# Patient Record
Sex: MALE | Race: WHITE | NOT HISPANIC OR LATINO | Employment: FULL TIME | URBAN - METROPOLITAN AREA
[De-identification: names, ages, dates, MRNs, and addresses within clinical notes are randomized per-mention and may not be internally consistent; named-entity substitution may affect disease eponyms.]

---

## 2024-02-05 ENCOUNTER — OFFICE VISIT (OUTPATIENT)
Dept: UROLOGY | Facility: CLINIC | Age: 34
End: 2024-02-05
Payer: COMMERCIAL

## 2024-02-05 ENCOUNTER — LAB VISIT (OUTPATIENT)
Dept: LAB | Facility: HOSPITAL | Age: 34
End: 2024-02-05
Attending: UROLOGY
Payer: COMMERCIAL

## 2024-02-05 VITALS
DIASTOLIC BLOOD PRESSURE: 79 MMHG | SYSTOLIC BLOOD PRESSURE: 122 MMHG | WEIGHT: 260 LBS | HEIGHT: 72 IN | HEART RATE: 69 BPM | BODY MASS INDEX: 35.21 KG/M2

## 2024-02-05 DIAGNOSIS — E29.1 TESTICULAR FAILURE: ICD-10-CM

## 2024-02-05 DIAGNOSIS — E29.1 TESTICULAR FAILURE: Primary | ICD-10-CM

## 2024-02-05 LAB
ESTRADIOL SERPL-MCNC: 22 PG/ML (ref 11–44)
FSH SERPL-ACNC: 7.58 MIU/ML (ref 0.95–11.95)
LH SERPL-ACNC: 4.6 MIU/ML (ref 0.6–12.1)
PROLACTIN SERPL IA-MCNC: 9.9 NG/ML (ref 3.5–19.4)
TESTOST SERPL-MCNC: 575 NG/DL (ref 304–1227)

## 2024-02-05 PROCEDURE — 1160F RVW MEDS BY RX/DR IN RCRD: CPT | Mod: CPTII,S$GLB,, | Performed by: UROLOGY

## 2024-02-05 PROCEDURE — 1159F MED LIST DOCD IN RCRD: CPT | Mod: CPTII,S$GLB,, | Performed by: UROLOGY

## 2024-02-05 PROCEDURE — 83002 ASSAY OF GONADOTROPIN (LH): CPT | Performed by: UROLOGY

## 2024-02-05 PROCEDURE — 84403 ASSAY OF TOTAL TESTOSTERONE: CPT | Performed by: UROLOGY

## 2024-02-05 PROCEDURE — 84146 ASSAY OF PROLACTIN: CPT | Performed by: UROLOGY

## 2024-02-05 PROCEDURE — 3074F SYST BP LT 130 MM HG: CPT | Mod: CPTII,S$GLB,, | Performed by: UROLOGY

## 2024-02-05 PROCEDURE — 88230 TISSUE CULTURE LYMPHOCYTE: CPT | Performed by: UROLOGY

## 2024-02-05 PROCEDURE — 99999 PR PBB SHADOW E&M-NEW PATIENT-LVL III: CPT | Mod: PBBFAC,,, | Performed by: UROLOGY

## 2024-02-05 PROCEDURE — 82670 ASSAY OF TOTAL ESTRADIOL: CPT | Performed by: UROLOGY

## 2024-02-05 PROCEDURE — 81403 MOPATH PROCEDURE LEVEL 4: CPT | Performed by: UROLOGY

## 2024-02-05 PROCEDURE — 88262 CHROMOSOME ANALYSIS 15-20: CPT | Performed by: UROLOGY

## 2024-02-05 PROCEDURE — 3008F BODY MASS INDEX DOCD: CPT | Mod: CPTII,S$GLB,, | Performed by: UROLOGY

## 2024-02-05 PROCEDURE — 3078F DIAST BP <80 MM HG: CPT | Mod: CPTII,S$GLB,, | Performed by: UROLOGY

## 2024-02-05 PROCEDURE — 99204 OFFICE O/P NEW MOD 45 MIN: CPT | Mod: S$GLB,,, | Performed by: UROLOGY

## 2024-02-05 PROCEDURE — 83001 ASSAY OF GONADOTROPIN (FSH): CPT | Performed by: UROLOGY

## 2024-02-05 RX ORDER — TADALAFIL 20 MG/1
20 TABLET ORAL DAILY
COMMUNITY
Start: 2024-01-11

## 2024-02-05 NOTE — PROGRESS NOTES
"Chief Complaint:  Infertility    HPI:    Mr. Jarrett is a 33 y.o.  male who has been  to his wife for the past 2 years. They have been trying to achieve a pregnancy for the past 6 months but without success. 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 He denies a history of erectile dysfunction and ejaculatory problems.    He has achieved 0 pregnancies in the past.    SA 23 (PRADEEP)--1.7 cc/0.005 million per cc/51%/0%  SA 24 (PRADEEP)-2.1 cc/0.02 million per cc/54%/0%    Geetha Jarrett is 33 years old. ( [birth year]) Her menses are regular. She has not undergone prior hysterosalpingogram. She has achieved 0 prior pregnancies.  She sees Dr. Rosario.    The couple has not undergone prior intrauterine insemination procedures.    The couple has not undergone prior in-vitro fertilization procedures.    Olu Jarrett denies a history of exposure to harmful chemicals, toxins, and radiation.    No history of recent fevers greater than 101.5 degrees Farenheit.    No history of recent exposure to "wet heat."    No history of urological trauma or testicular torsion.    No history of prostatitis, epididymitis, and orchitis.    No history of post-pubertal mumps.    There is no known family history of fertility problems.    REVIEW OF SYSTEMS:     He denies headache, blurred vision, fever, nausea, vomiting, chills, abdominal pain, chest pain, sore throat, bleeding per rectum, cough, SOB, recent loss of consciousness, recent mental status changes, seizures, dizziness, or upper or lower extremity weakness.    PHYSICAL EXAM:     The patient generally appears in good health, is appropriately interactive, and is in no apparent distress.     Eyes: anicteric sclerae, moist conjunctivae; no lid-lag; PERRLA     HENT: Atraumatic; oropharynx clear with moist mucous membranes and no mucosal ulcerations;normal hard and soft palate.  No evidence of lymphadenopathy.    Neck: Trachea midline.  No " "thyromegaly.    Skin: No lesions.    Mental: Cooperative with normal affect.  Is oriented to time, place, and person.    Neuro: Grossly intact.    Chest: Normal inspiratory effort.   No accessory muscles.  No audible wheezes.  Respirations symmetric on inspiration and expiration.    Heart: Regular rhythm.      Abdomen:  Soft, non-tender. No masses or organomegaly. Bladder is not palpable. No evidence of flank discomfort. No evidence of inguinal hernia.    Genitourinary: Penis is normal with no evidence of plaques or induration. Urethral meatus is normal. Scrotum is normal. Testes are descended bilaterally with no evidence of abnormal masses or tenderness. Epididymis, vas deferens, and cord structures are normal bilaterally.  Testicular volume is approximately 18 cc bilaterally.    Extremities: No cyanosis, clubbing, or edema.    IMPRESSION & PLAN:    Mr. Jarrett is a 33 y.o.  male who has been  to his wife for the past 2 years. They have been trying to achieve a pregnancy for the past 6 months but without success. 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 He denies a history of erectile dysfunction and ejaculatory problems.    He has achieved 0 pregnancies in the past.    SA 12/18/23 (PRADEEP)--1.7 cc/0.005 million per cc/51%/0%  SA 1/14/24 (PRADEEP)-2.1 cc/0.02 million per cc/54%/0%    1.  FSH, LH, testosterone, prolactin, and estradiol serum levels today. Will also draw karyotype and Y chromosome microdeletion.  2.  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  Recommend sperm cryopreservation.  3.  Return to the clinic in 3 weeks to discuss test results and treatment plan.  4.  Recommend avoiding "wet heat."  5.  Recommend taking a multivitamin and 500 mg of vitamin c daily in addition to the multivitamin.  6.  Please send a copy of the note to Dr. Rosario.  Thank you for the consultation.    CC: Abraham      "

## 2024-02-05 NOTE — LETTER
February 5, 2024        Britt Rosario MD  [address] [place] [id number]             Syed Perez - Urology [facility] 4th Fl  [address]  [facility] [address]  Phone: [phone]   Patient: Olu Jarrett   MR Number: [id number]   YOB: 1990   Date of Visit: 2/5/2024       Dear Dr. Rosario:    Thank you for referring Olu Jarrett to me for evaluation. Attached you will find relevant portions of my assessment and plan of care.    If you have questions, please do not hesitate to call me. I look forward to following Olu Jarrett along with you.    Sincerely,      Sagar Abreu MD            CC  No Recipients    Enclosure

## 2024-02-08 LAB
GENETICIST REVIEW: NORMAL
SPECIMEN SOURCE: NORMAL
Y CHROM DEL BLD/T: NORMAL
Y MICRODELETION RELEASED BY: NORMAL
Y MICRODELETION RESULT SUMMARY: POSITIVE
Y MICRODELETION SPECIMEN: NORMAL

## 2024-02-13 LAB
CHROM BANDING METHOD: NORMAL
CHROMOSOME ANALYSIS CONGENITAL ADDITIONAL INFORMATION: NORMAL
CHROMOSOME ANALYSIS CONGENITAL RELEASED BY: NORMAL
CHROMOSOME ANALYSIS CONGENITAL RESULT SUMMARY: NORMAL
CLINICAL CYTOGENETICIST REVIEW: NORMAL
KARYOTYP SPEC: NORMAL
REASON FOR REFERRAL, CHROMOSOME ANALYSIS CONGENITAL: NORMAL
REF LAB TEST METHOD: NORMAL
SPECIMEN SOURCE: NORMAL
SPECIMEN, CHROMOSOME ANALYSIS CONGENITAL: NORMAL

## 2024-02-22 ENCOUNTER — PATIENT MESSAGE (OUTPATIENT)
Dept: UROLOGY | Facility: CLINIC | Age: 34
End: 2024-02-22
Payer: COMMERCIAL

## 2024-02-26 ENCOUNTER — OFFICE VISIT (OUTPATIENT)
Dept: UROLOGY | Facility: CLINIC | Age: 34
End: 2024-02-26
Payer: COMMERCIAL

## 2024-02-26 VITALS
WEIGHT: 268.94 LBS | HEIGHT: 72 IN | DIASTOLIC BLOOD PRESSURE: 78 MMHG | BODY MASS INDEX: 36.43 KG/M2 | SYSTOLIC BLOOD PRESSURE: 128 MMHG | HEART RATE: 69 BPM

## 2024-02-26 DIAGNOSIS — E29.1 TESTICULAR FAILURE: Primary | ICD-10-CM

## 2024-02-26 PROCEDURE — 3008F BODY MASS INDEX DOCD: CPT | Mod: CPTII,S$GLB,, | Performed by: UROLOGY

## 2024-02-26 PROCEDURE — 99214 OFFICE O/P EST MOD 30 MIN: CPT | Mod: S$GLB,,, | Performed by: UROLOGY

## 2024-02-26 PROCEDURE — 1160F RVW MEDS BY RX/DR IN RCRD: CPT | Mod: CPTII,S$GLB,, | Performed by: UROLOGY

## 2024-02-26 PROCEDURE — 99999 PR PBB SHADOW E&M-EST. PATIENT-LVL III: CPT | Mod: PBBFAC,,, | Performed by: UROLOGY

## 2024-02-26 PROCEDURE — 3074F SYST BP LT 130 MM HG: CPT | Mod: CPTII,S$GLB,, | Performed by: UROLOGY

## 2024-02-26 PROCEDURE — 1159F MED LIST DOCD IN RCRD: CPT | Mod: CPTII,S$GLB,, | Performed by: UROLOGY

## 2024-02-26 PROCEDURE — 3078F DIAST BP <80 MM HG: CPT | Mod: CPTII,S$GLB,, | Performed by: UROLOGY

## 2024-02-26 NOTE — PROGRESS NOTES
"Chief Complaint:  Infertility    HPI:    Mr. Jarrett is a 33 y.o.  male who has been  to his wife for the past 2 years. They have been trying to achieve a pregnancy for the past 6 months but without success. lOu Jarrett has undergone a semen analysis x 3 showing severe oligoteratosopermia. He denies a history of erectile dysfunction and ejaculatory problems.    He was supposed to cryo his last SA, but he didn't do it.    Karyotype is normal.  He has an AZFc deletion.  Lab Results   Component Value Date    TOTALTESTOST 575 2024    LABLH 4.6 2024    FSH 7.58 2024    ESTRADIOL 22 2024    PROLACTIN 9.9 2024        SA 23 (PRADEEP)--1.7 cc/0.005 million per cc/51%/0%  SA 24 (PRADEEP)-2.1 cc/0.02 million per cc/54%/0%  SA 24 (PRADEEP)--1.6 cc/0.002 million per cc/35%/NA    FOR REVIEW FROM PREVIOUS:    Mr. Jarrett is a 33 y.o.  male who has been  to his wife for the past 2 years. They have been trying to achieve a pregnancy for the past 6 months but without success. 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 He denies a history of erectile dysfunction and ejaculatory problems.    He has achieved 0 pregnancies in the past.    SA 23 (PRADEEP)--1.7 cc/0.005 million per cc/51%/0%  SA 24 (PRADEEP)-2.1 cc/0.02 million per cc/54%/0%    Geetha Jarrett is 33 years old. ( [birth year]) Her menses are regular. She has not undergone prior hysterosalpingogram. She has achieved 0 prior pregnancies.  She sees Dr. Rosario.    The couple has not undergone prior intrauterine insemination procedures.    The couple has not undergone prior in-vitro fertilization procedures.    Olu Jarrett denies a history of exposure to harmful chemicals, toxins, and radiation.    No history of recent fevers greater than 101.5 degrees Farenheit.    No history of recent exposure to "wet heat."    No history of urological trauma or testicular torsion.    No history of " prostatitis, epididymitis, and orchitis.    No history of post-pubertal mumps.    There is no known family history of fertility problems.    REVIEW OF SYSTEMS:     He denies headache, blurred vision, fever, nausea, vomiting, chills, abdominal pain, chest pain, sore throat, bleeding per rectum, cough, SOB, recent loss of consciousness, recent mental status changes, seizures, dizziness, or upper or lower extremity weakness.    PHYSICAL EXAM:     The patient generally appears in good health, is appropriately interactive, and is in no apparent distress.     Eyes: anicteric sclerae, moist conjunctivae; no lid-lag; PERRLA     HENT: Atraumatic; oropharynx clear with moist mucous membranes and no mucosal ulcerations;normal hard and soft palate.  No evidence of lymphadenopathy.    Neck: Trachea midline.  No thyromegaly.    Skin: No lesions.    Mental: Cooperative with normal affect.  Is oriented to time, place, and person.    Neuro: Grossly intact.    Chest: Normal inspiratory effort.   No accessory muscles.  No audible wheezes.  Respirations symmetric on inspiration and expiration.    Heart: Regular rhythm.      Abdomen:  Soft, non-tender. No masses or organomegaly. Bladder is not palpable. No evidence of flank discomfort. No evidence of inguinal hernia.    Genitourinary: Penis is normal with no evidence of plaques or induration. Urethral meatus is normal. Scrotum is normal. Testes are descended bilaterally with no evidence of abnormal masses or tenderness. Epididymis, vas deferens, and cord structures are normal bilaterally.  Testicular volume is approximately 18 cc bilaterally.    Extremities: No cyanosis, clubbing, or edema.    IMPRESSION & PLAN:    Mr. Jarrett is a 33 y.o.  male who has been  to his wife for the past 2 years. They have been trying to achieve a pregnancy for the past 6 months but without success. Olu Jarrett has undergone a semen analysis x 3 showing severe oligoteratosopermia. He denies a history  "of erectile dysfunction and ejaculatory problems.    Karyotype is normal.  He has an AZFc deletion.  Lab Results   Component Value Date    TOTALTESTOST 575 02/05/2024    LABLH 4.6 02/05/2024    FSH 7.58 02/05/2024    ESTRADIOL 22 02/05/2024    PROLACTIN 9.9 02/05/2024        SA 12/18/23 (PRADEEP)--1.7 cc/0.005 million per cc/51%/0%  SA 1/14/24 (PRADEEP)-2.1 cc/0.02 million per cc/54%/0%  SA 2/21/24 (PRADEEP)--1.6 cc/0.002 million per cc/35%/NA    1.  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  2.  Discussed that he has an AZFc deletion.    3.  From a male factor, efforts with IVF are most appropriate.  4.  Recommend avoiding "wet heat."  5.  Recommend taking a multivitamin and 500 mg of vitamin c daily in addition to the multivitamin.  6.  RTC 6 months if not pregnant.  7.  Again recommend semen cryo.    CC: Abraham      "

## 2024-02-26 NOTE — LETTER
February 26, 2024        Britt Rosario MD  [address] [place] [id number]             Syed Perez - Urology [facility] 4th Fl  [address]  [facility] [address]  Phone: [phone]   Patient: Olu Jarrett   MR Number: [id number]   YOB: 1990   Date of Visit: 2/26/2024       Dear Dr. Rosario:    Thank you for referring Olu Jarrett to me for evaluation. Attached you will find relevant portions of my assessment and plan of care.    If you have questions, please do not hesitate to call me. I look forward to following Olu Jarrett along with you.    Sincerely,      Sagar Abreu MD            CC  No Recipients    Enclosure